# Patient Record
Sex: FEMALE | Race: WHITE
[De-identification: names, ages, dates, MRNs, and addresses within clinical notes are randomized per-mention and may not be internally consistent; named-entity substitution may affect disease eponyms.]

---

## 2017-08-27 ENCOUNTER — HOSPITAL ENCOUNTER (EMERGENCY)
Dept: HOSPITAL 74 - JERFT | Age: 42
Discharge: HOME | End: 2017-08-27
Payer: COMMERCIAL

## 2017-08-27 VITALS — TEMPERATURE: 98.9 F | DIASTOLIC BLOOD PRESSURE: 61 MMHG | HEART RATE: 95 BPM | SYSTOLIC BLOOD PRESSURE: 118 MMHG

## 2017-08-27 VITALS — BODY MASS INDEX: 30.8 KG/M2
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
## 2017-08-27 DIAGNOSIS — S93.402A: Primary | ICD-10-CM

## 2017-08-27 DIAGNOSIS — Z91.81: ICD-10-CM
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
## 2017-08-27 NOTE — PDOC
History of Present Illness





- General


Chief Complaint: Pain


Stated Complaint: LT FOOT PAIN


Time Seen by Provider: 08/27/17 16:05


History Source: Patient


Exam Limitations: No Limitations





- History of Present Illness


Initial Comments: 





08/27/17 17:28


42 yr female with c/o left ankle pain for 4 days since fall 5 days ago. Pt has 

history of fall . Pt ambulatory no acute distress.





Past History





- Past Medical History


Allergies/Adverse Reactions: 


 Allergies











Allergy/AdvReac Type Severity Reaction Status Date / Time


 


TREE FRUITS Allergy   Uncoded 08/27/17 15:41











Home Medications: 


Ambulatory Orders





NK [No Known Home Medication]  08/27/17 








Other medical history: NONE





- Psycho/Social/Smoking Cessation Hx


Anxiety: No


Suicidal Ideation: No


Smoking History: Never smoked


Hx Alcohol Use: No


Drug/Substance Use Hx: No


Substance Use Type: None





*Physical Exam





- Vital Signs


 Last Vital Signs











Temp Pulse Resp BP Pulse Ox


 


 98.9 F   95 H  20   118/61   100 


 


 08/27/17 15:39  08/27/17 15:39  08/27/17 15:39  08/27/17 15:39  08/27/17 15:39














- Physical Exam


General Appearance: Yes: Nourished, Appropriately Dressed


HEENT: positive: EOMI, HARINDER


Respiratory/Chest: positive: Lungs Clear, Normal Breath Sounds


Cardiovascular: positive: Regular Rhythm, Regular Rate


Musculoskeletal: positive: Normal Inspection


Extremity: positive: Tender (lateral malleolus left ankle  , nv intact, FROM )


Integumentary: positive: Normal Color, Dry, Warm


Neurologic: positive: Fully Oriented, Alert, Normal Mood/Affect, Normal Response

, Motor Strength 5/5





ED Treatment Course





- ADDITIONAL ORDERS


Additional order review: 


 Laboratory  Results











  08/27/17





  16:32


 


Urine HCG, Qual  Negative














- RADIOLOGY


Radiology Studies Ordered: 














 Category Date Time Status


 


 ANKLE & FOOT-LEFT* [RAD] Stat Radiology  08/27/17 16:08 Taken














Medical Decision Making





- Medical Decision Making





08/27/17 17:43


cc: left ankle pain for 4-5 days after fall


pt is ambualtory took tylenol earlier today


will get xray to r/o fracture 


motrin for pain 





*DC/Admit/Observation/Transfer


Diagnosis at time of Disposition: 


Left ankle sprain


Qualifiers:


 Encounter type: initial encounter Involved ligament of ankle: unspecified 

ligament Qualified Code(s): S93.402A - Sprain of unspecified ligament of left 

ankle, initial encounter





- Discharge Dispostion


Disposition: HOME


Condition at time of disposition: Good





- Referrals


Referrals: 


Boy Romo MD [Staff Physician] - 





- Patient Instructions


Additional Instructions: 


elevate and apply warm compresses to the area of pain every 2hrs for 20 minutes 


take motrin for pain as needed


use the air cast splint while awake remove to sleep and bathe





follow with the orthopedist Dr. Romo for follow up if symptoms worsen or 

persist

## 2019-01-12 ENCOUNTER — HOSPITAL ENCOUNTER (EMERGENCY)
Dept: HOSPITAL 74 - JERFT | Age: 44
Discharge: HOME | End: 2019-01-12
Payer: COMMERCIAL

## 2019-01-12 VITALS — BODY MASS INDEX: 30 KG/M2

## 2019-01-12 VITALS — TEMPERATURE: 98.2 F | HEART RATE: 87 BPM | DIASTOLIC BLOOD PRESSURE: 91 MMHG | SYSTOLIC BLOOD PRESSURE: 147 MMHG

## 2019-01-12 DIAGNOSIS — H66.91: Primary | ICD-10-CM

## 2019-01-12 DIAGNOSIS — J02.9: ICD-10-CM

## 2019-01-12 NOTE — PDOC
History of Present Illness





- General


Chief Complaint: Sore Throat


Stated Complaint: SORE THROAT


Time Seen by Provider: 01/12/19 22:10


History Source: Patient


Exam Limitations: No Limitations





- History of Present Illness


Initial Comments: 





01/12/19 22:23


43 year old female with no medical or surgical history presents with complaints 

of sorethroat x 3 days.  Also reports pain in right ear, mouth dryness .  Took 

ibuprofen and tylenol with no relief of symptoms. Denies fever chills or 

headache or difficulty swallowing.  


Timing/Duration: other (3 days )


Severity: mild


Modifying Factors: improves with: medication


Associated Symptoms: reports: fever/chills


Aspirin Received prior to arrival: Yes: no aspirin today


Asa Contraindications(Core Measure): No: Allergy


Beta Blocker Contraindications(Core Measure): Yes: Not Prescribed





Past History





- Travel


Traveled outside of the country in the last 30 days: No





- Past Medical History


Allergies/Adverse Reactions: 


 Allergies











Allergy/AdvReac Type Severity Reaction Status Date / Time


 


TREE FRUITS Allergy   Uncoded 01/12/19 21:47











Home Medications: 


Ambulatory Orders





Amoxicillin - [Amoxicillin 500mg Capsule -] 500 mg PO TID #30 capsule 01/12/19 








COPD: No





- Suicide/Smoking/Psychosocial Hx


Smoking History: Never smoked


Hx Alcohol Use: No


Drug/Substance Use Hx: No


Substance Use Type: None





**Review of Systems





- Review of Systems


Able to Perform ROS?: Yes


Is the patient limited English proficient: No


Constitutional: Yes: Fever.  No: Chills


HEENTM: Yes: Ear Pain, Throat Pain


Respiratory: No: Cough, Shortness of Breath, Wheezing, Productive cough


Cardiac (ROS): No: Chest Pain, Lightheadedness


ABD/GI: No: Nausea, Poor Appetite, Indigestion


: No: Dysuria, Discharge, Hematuria


Integumentary: No: Bruising, Erythema


Neurological: No: Headache, Numbness, Paresthesia, Weakness





*Physical Exam





- Vital Signs


 Last Vital Signs











Temp Pulse Resp BP Pulse Ox


 


 98.2 F   87   18   147/91   98 


 


 01/12/19 21:45  01/12/19 21:45  01/12/19 21:45  01/12/19 21:45  01/12/19 21:45














- Physical Exam


General Appearance: Yes: Nourished, Appropriately Dressed


HEENT: positive: EOMI, Pharyngeal Erythema, TM Erythema (right ear with tm 

erythema).  negative: Tonsillar Exudate


Neck: positive: Supple.  negative: Lymphadenopathy (R), Lymphadenopathy (L)


Respiratory/Chest: positive: Lungs Clear, Normal Breath Sounds


Cardiovascular: positive: Regular Rate, S1, S2


Extremity: positive: Normal Capillary Refill, Normal Inspection


Neurologic: positive: CNs II-XII NML intact, Fully Oriented, Alert





Moderate Sedation





- Procedure Monitoring


Vital Signs: 


Procedure Monitoring Vital Signs











Temperature  98.2 F   01/12/19 21:45


 


Pulse Rate  87   01/12/19 21:45


 


Respiratory Rate  18   01/12/19 21:45


 


Blood Pressure  147/91   01/12/19 21:45


 


O2 Sat by Pulse Oximetry (%)  98   01/12/19 21:45











Medical Decision Making





- Medical Decision Making





01/12/19 22:27


43 year old female with no medical or surgical history presents with complaints 

of sorethroat x 3 days.  Also reports pain in right ear, mouth dryness .  Took 

ibuprofen and tylenol with no relief of symptoms. Denies fever chills or 

headache or difficulty swallowing.





plan 


rx: amoxicillin 








*DC/Admit/Observation/Transfer


Diagnosis at time of Disposition: 


Otitis media


Qualifiers:


 Otitis media type: unspecified Chronicity: acute Qualified Code(s): H66.90 - 

Otitis media, unspecified, unspecified ear





Pharyngitis


Qualifiers:


 Pharyngitis/tonsillitis etiology: unspecified etiology Qualified Code(s): 

J02.9 - Acute pharyngitis, unspecified








- Discharge Dispostion


Disposition: HOME


Condition at time of disposition: Good


Decision to Admit order: No





- Prescriptions


Prescriptions: 


Amoxicillin - [Amoxicillin 500mg Capsule -] 500 mg PO TID #30 capsule





- Referrals





- Patient Instructions


Printed Discharge Instructions:  Middle Ear Infection, Sore Throat


Additional Instructions: 


Please drink plenty fluids to keep hydrated


May suck on hard candy to soothe throat 


Take medication for pain and fever


Take antibiotics for 10 days until complete


Call primary for follow up appointment 


Return to ed for worsening symptoms





- Post Discharge Activity


Forms/Work/School Notes:  Back to Work